# Patient Record
Sex: MALE | Race: WHITE | NOT HISPANIC OR LATINO | Employment: FULL TIME | URBAN - METROPOLITAN AREA
[De-identification: names, ages, dates, MRNs, and addresses within clinical notes are randomized per-mention and may not be internally consistent; named-entity substitution may affect disease eponyms.]

---

## 2020-05-28 ENCOUNTER — TRANSFERRED RECORDS (OUTPATIENT)
Dept: HEALTH INFORMATION MANAGEMENT | Facility: CLINIC | Age: 35
End: 2020-05-28

## 2021-08-13 ENCOUNTER — TRANSFERRED RECORDS (OUTPATIENT)
Dept: HEALTH INFORMATION MANAGEMENT | Facility: CLINIC | Age: 36
End: 2021-08-13

## 2021-09-09 ENCOUNTER — TRANSFERRED RECORDS (OUTPATIENT)
Dept: HEALTH INFORMATION MANAGEMENT | Facility: CLINIC | Age: 36
End: 2021-09-09

## 2021-09-16 ENCOUNTER — TRANSFERRED RECORDS (OUTPATIENT)
Dept: HEALTH INFORMATION MANAGEMENT | Facility: CLINIC | Age: 36
End: 2021-09-16

## 2021-10-21 DIAGNOSIS — D33.3 BENIGN NEOPLASM OF CRANIAL NERVE (H): Primary | ICD-10-CM

## 2021-10-25 ENCOUNTER — PRE VISIT (OUTPATIENT)
Dept: RADIATION ONCOLOGY | Facility: CLINIC | Age: 36
End: 2021-10-25
Payer: COMMERCIAL

## 2021-10-25 NOTE — NURSING NOTE
Date: 10/25/2021   Age: 36 year old  Ethnicity:    Sex: male  : 1985   Lives In: [place], [place]      Diagnosis: Right Acoustic Neuroma    Prior radiation therapy:   Site Treated: at  Facility: a  Dates: a  Dose: at    Prior chemotherapy:   Protocol: at  Facility: ta  Dates: at    RN time with patient:  Educated on Gamma Knife:    Doctors:  Dr. Yao Ye, Dr. Aldo Lyons, Daniela Chavarria,     Rosa at time of consult:  Does pt have a living will:  Does pt have implanted cardiac device:    Has pt fallen in past week:  Does pt feel steady on his feet:       Review Since Diagnosis:    ; hearing test by  noted no problem with assymetric hearing loss    Approximately 2017; pt started noticing decreased hearing in right ear, to the point of non functioning hearing, primary ordered a  Brain MRI    20; Brain MRI at [facility], mass right internal auditory canal, extends to right IAC laerally and CP cistern medially . Intracanalicular component 1.0 x 0.4 x 0.5 cm, Cisternal component 0.7 x 0.9 x 1.1 cm    20; saw an ENT, just follow up scans      21; Brain MRI at [facility], lesion right internal auditory canal and cerebellopontine angle 2.0 x 1.4 x 1.7 cm, felt to be an acoustic neuroma     21; pt saw Dr. Lyons, saw in consult,  Notes non functioning hearing on right, no numbness, tingling, balance issues.  Discussed options, felt should consult with Dr. Ye     21; pt saw Dr. Ye in consult for enlarging right acoustic neuroma, currently unable to use telephone on right, no tinnitus and no balance issues.  Discussed options but felt it should be treated and no longer observed    Chief Complaint: at consult

## 2021-11-02 NOTE — PROGRESS NOTES
Department of Radiation Oncology  [facility]  [address] [place] [address]  [phone]       Consultation Note    Name: Tevin Nichols MRN: [id number]   : 1985   Date of Service: 2021  Referring: Dr. Ye / neurosurgery     Diagnosis: RIGHT acoustic neuroma    History of Present Illness   Mr. Nichols is a 36 year old man with diagnosis of an enlarging RIGHT acoustic neuroma.    He initially began noticing decreased hearing in the right ear in . The hearing loss has been progressive since that time, which led to further evaluation with imaging.    MRI brain / IAC 20 demonstrated an enhancing extra-axial soft tissue mass centered at the porus acusticus of the right internal auditory canal with extension into the right IAC laterally and cerebellopontine cistern medially, consistent with an acoustic neuroma. The intracanalicular component measured 9.6 x 4.4 x 5.4 mm, and the cisternal component measured 7.2 x 9 x 10.7 mm.     He then established care with Dr. Lyons (ENT). The patient elected for observation. Follow-up MRI 21 demonstrated increased size of the acoustic neuroma, now filling and expanding the internal auditory canal. The aggregate measurement is now 20 x 14 x 17 mm.     Given growth of the acoustic neuroma, he was referred to Dr. Ye. Treatment options with either Gamma Knife stereotactic radiosurgery vs surgical resection via a translabyrinthine approach were discussed.    Today, he presents to discuss GK-SRS as a potential treatment option. He does not have tinnitus, headaches, or bothersome issues with balance (he continues to run and play sports). He does not have any serviceable hearing in the right ear.     CHEMOTHERAPY HISTORY: None  PACEMAKER: None  PAST RADIATION THERAPY HISTORY: None    PAST MEDICAL HISTORY:    Right acoustic neuroma    PAST SURGICAL HISTORY:    Tosillectomy    Orthopedic surgery on  "the ankle    ALLERGIES:  Allergies as of 11/04/2021 - Reviewed 11/04/2021   Allergen Reaction Noted     Codeine  10/25/2021     Sulfa drugs  10/25/2021       MEDICATIONS:  No current outpatient medications on file.        FAMILY HISTORY:  History reviewed. No pertinent family history.    SOCIAL HISTORY:  Single  Never smoker  Was in the   Works as  for [employer]    Review of Systems   A 12-point review of systems was performed. Pertinent findings are noted in the HPI.    Physical Exam   ECOG Status: 0    VITALS: BP (!) 142/78   Pulse 77   Resp 16   Ht 1.702 m (5' 7\")   Wt 86.2 kg (190 lb)   SpO2 94%   BMI 29.76 kg/m    GEN: Appears well, alert, oriented, and in NAD  HEENT: EOMI, normal conjunctiva, MMM  NECK: Supple, full ROM  CV: RRR, warm and well-perfused  RESP: No audible wheezing, breathing comfortably on room air  ABDOMEN: Soft, non-tender, non-distended  SKIN: Normal color and turgor  NEURO: Decreased hearing on the right side but otherwise CN 3-12 grossly intact, normal and symmetric motor and sensory exam in bilateral upper and lower extremities, normal gait  PSYCH: Appropriate mood and affect    Imaging/Path/Labs   Imaging: Reviewed    Path: N/A    Labs: No recent labs    Assessment    Mr. Nichols is a 36 year old man with an enlarging RIGHT acoustic neuroma.     Plan   The diagnosis and recent MRI imaging were reviewed with Mr. Nichols. Given the increased size of the tumor over the past year, treatment as opposed to further observation was recommended. Options for treatment include surgery vs radiosurgery. He had previously seen Dr. Ye and preferred the radiosurgery approach.    The logistics and rationale for treatment were discussed in detail. Goal of radiotherapy is for local control and prevent further growth of the tumor. Potential acute and late toxicities were reviewed. Informed consent was obtained.    He is scheduled to return for Gamma Knife on " 11/10/21.     Patient was seen and discussed with Dr. Hernandez.    Elidia Andujar MD PGY-3  Radiation Oncology      Attending addendum:   I saw and examined the patient with the resident and agree with the documented plan of care.    Mr. Nichols has an enlarging right acoustic neuroma in the setting of no serviceable hearing on the affected side. He has been evaluated by ENT and neurosurgery and has been recommended to proceed with treatment either with surgical resection or radiosurgery. I focused the bulk of our discussion today on the role of SRS in the treatment of acoustic neuromas such as his.     While there can be some slight reduction in size of the tumor on long-term follow-up after SRS, I explained that the goal of radiosurgery is to predominantly prevent further symptomatic growth of the tumor. I reviewed both the acute and potential late-term toxicities associated with skull base radiosurgery and answered Mr. Nichols's to his stated satisfaction. At the end of our discussion, Mr. Nichols was amenable to proceeding with SRS and signed informed consent to that effect.     I will have Mr. Nichols return to clinic on Wednesday, 11/10/2021, for frame-based single-fraction SRS using our Gamma Knife ICON treatment platform performed in coordination with Dr. Ye.     Lester Hernandez MD/PhD    Dept of Radiation Oncology  [facility]

## 2021-11-04 ENCOUNTER — OFFICE VISIT (OUTPATIENT)
Dept: RADIATION ONCOLOGY | Facility: CLINIC | Age: 36
End: 2021-11-04
Attending: RADIOLOGY
Payer: COMMERCIAL

## 2021-11-04 VITALS
RESPIRATION RATE: 16 BRPM | HEIGHT: 67 IN | HEART RATE: 77 BPM | OXYGEN SATURATION: 94 % | WEIGHT: 190 LBS | SYSTOLIC BLOOD PRESSURE: 142 MMHG | BODY MASS INDEX: 29.82 KG/M2 | DIASTOLIC BLOOD PRESSURE: 78 MMHG

## 2021-11-04 DIAGNOSIS — D33.3 ACOUSTIC NEUROMA (H): Primary | ICD-10-CM

## 2021-11-04 PROCEDURE — G0463 HOSPITAL OUTPT CLINIC VISIT: HCPCS | Performed by: RADIOLOGY

## 2021-11-04 ASSESSMENT — ENCOUNTER SYMPTOMS
MUSCULOSKELETAL NEGATIVE: 1
CARDIOVASCULAR NEGATIVE: 1
BLURRED VISION: 1
PSYCHIATRIC NEGATIVE: 1
TINGLING: 1
RESPIRATORY NEGATIVE: 1
CONSTITUTIONAL NEGATIVE: 1
GASTROINTESTINAL NEGATIVE: 1

## 2021-11-04 ASSESSMENT — PAIN SCALES - GENERAL: PAINLEVEL: NO PAIN (0)

## 2021-11-04 ASSESSMENT — MIFFLIN-ST. JEOR: SCORE: 1750.46

## 2021-11-04 NOTE — PROGRESS NOTES
HPI  Date: 10/25/2021   Age: 36 year old  Ethnicity:    Sex: male  : 1985   Lives In: [place], [place]      Diagnosis: Right Acoustic Neuroma    Prior radiation therapy:   None    Prior chemotherapy:   No    RN time with patient: 50 minutes  Educated on Gamma Knife: yes    Doctors:  Dr. Yao Ye, Dr. Aldo Lyons, Daniela Chavarria (PCP),     Pain at time of consult: No  Does pt have a living will: No  Does pt have implanted cardiac device: No    Has pt fallen in past week: No  Does pt feel steady on his feet: No      Review Since Diagnosis:    ; hearing test by  noted no problem with assymetric hearing loss    Approximately ; pt started noticing decreased hearing in right ear, to the point of non functioning hearing, primary ordered a  Brain MRI    20; Brain MRI at [facility], mass right internal auditory canal, extends to right IAC laerally and CP cistern medially . Intracanalicular component 1.0 x 0.4 x 0.5 cm, Cisternal component 0.7 x 0.9 x 1.1 cm    20; saw an ENT, just follow up scans      21; Brain MRI at [facility], lesion right internal auditory canal and cerebellopontine angle 2.0 x 1.4 x 1.7 cm, felt to be an acoustic neuroma     21; pt saw Dr. Lyons, saw in consult,  Notes non functioning hearing on right, no numbness, tingling, balance issues.  Discussed options, felt should consult with Dr. Ye     21; pt saw Dr. Ye in consult for enlarging right acoustic neuroma, currently unable to use telephone on right, no tinnitus and no balance issues.  Discussed options but felt it should be treated and no longer observed    Chief Complaint:  Hearing: virtually lost of right side  Phone: use on left ear only  Tinnitus: as below  Balance: just started noticing slight imbalance with quick turning; doesn't impede any regular activity    Review of Systems   Constitutional: Negative.    HENT: Positive for hearing loss and tinnitus.          No hearing in right ear. Took about 1.5 years for basically total loss. Used to have tinnitus in ear but no longer does   Eyes: Positive for blurred vision.        Right eye with slight blurred vision; no correction needed.    Respiratory: Negative.    Cardiovascular: Negative.    Gastrointestinal: Negative.    Genitourinary: Negative.    Musculoskeletal: Negative.    Skin: Negative.    Neurological: Positive for tingling.        Describes 'lighting' shooting pain directly on top of right ear. Last a second or two. Happens about every 2 weeks. This started happening @ 2-3 years ago but become more aware after diagnosis confirmed   Endo/Heme/Allergies: Negative.    Psychiatric/Behavioral: Negative.

## 2021-11-04 NOTE — LETTER
2021         RE: Tevin Nichols  [address] [address]         Department of Radiation Oncology  [facility]  [address] [place] [address]  [phone]       Consultation Note    Name: Tevin Nichols MRN: [id number]   : 1985   Date of Service: 2021  Referring: Dr. Ye / neurosurgery     Diagnosis: RIGHT acoustic neuroma    History of Present Illness   Mr. Nichols is a 36 year old man with diagnosis of an enlarging RIGHT acoustic neuroma.    He initially began noticing decreased hearing in the right ear in . The hearing loss has been progressive since that time, which led to further evaluation with imaging.    MRI brain / IAC 20 demonstrated an enhancing extra-axial soft tissue mass centered at the porus acusticus of the right internal auditory canal with extension into the right IAC laterally and cerebellopontine cistern medially, consistent with an acoustic neuroma. The intracanalicular component measured 9.6 x 4.4 x 5.4 mm, and the cisternal component measured 7.2 x 9 x 10.7 mm.     He then established care with Dr. Lyons (ENT). The patient elected for observation. Follow-up MRI 21 demonstrated increased size of the acoustic neuroma, now filling and expanding the internal auditory canal. The aggregate measurement is now 20 x 14 x 17 mm.     Given growth of the acoustic neuroma, he was referred to Dr. Ye. Treatment options with either Gamma Knife stereotactic radiosurgery vs surgical resection via a translabyrinthine approach were discussed.    Today, he presents to discuss GK-SRS as a potential treatment option. He does not have tinnitus, headaches, or bothersome issues with balance (he continues to run and play sports). He does not have any serviceable hearing in the right ear.     CHEMOTHERAPY HISTORY: None  PACEMAKER: None  PAST RADIATION THERAPY HISTORY: None    PAST MEDICAL HISTORY:    Right  "acoustic neuroma    PAST SURGICAL HISTORY:    Tosillectomy    Orthopedic surgery on the ankle    ALLERGIES:  Allergies as of 11/04/2021 - Reviewed 11/04/2021   Allergen Reaction Noted     Codeine  10/25/2021     Sulfa drugs  10/25/2021       MEDICATIONS:  No current outpatient medications on file.        FAMILY HISTORY:  History reviewed. No pertinent family history.    SOCIAL HISTORY:  Single  Never smoker  Was in the   Works as  for [employer]    Review of Systems   A 12-point review of systems was performed. Pertinent findings are noted in the HPI.    Physical Exam   ECOG Status: 0    VITALS: BP (!) 142/78   Pulse 77   Resp 16   Ht 1.702 m (5' 7\")   Wt 86.2 kg (190 lb)   SpO2 94%   BMI 29.76 kg/m    GEN: Appears well, alert, oriented, and in NAD  HEENT: EOMI, normal conjunctiva, MMM  NECK: Supple, full ROM  CV: RRR, warm and well-perfused  RESP: No audible wheezing, breathing comfortably on room air  ABDOMEN: Soft, non-tender, non-distended  SKIN: Normal color and turgor  NEURO: Decreased hearing on the right side but otherwise CN 3-12 grossly intact, normal and symmetric motor and sensory exam in bilateral upper and lower extremities, normal gait  PSYCH: Appropriate mood and affect    Imaging/Path/Labs   Imaging: Reviewed    Path: N/A    Labs: No recent labs    Assessment    Mr. Nichols is a 36 year old man with an enlarging RIGHT acoustic neuroma.     Plan   The diagnosis and recent MRI imaging were reviewed with Mr. Nichols. Given the increased size of the tumor over the past year, treatment as opposed to further observation was recommended. Options for treatment include surgery vs radiosurgery. He had previously seen Dr. Ye and preferred the radiosurgery approach.    The logistics and rationale for treatment were discussed in detail. Goal of radiotherapy is for local control and prevent further growth of the tumor. Potential acute and late toxicities were reviewed. " Informed consent was obtained.    He is scheduled to return for Gamma Knife on 11/10/21.     Patient was seen and discussed with Dr. Hernandez.    Elidia Andujar MD PGY-3  Radiation Oncology      Attending addendum:   I saw and examined the patient with the resident and agree with the documented plan of care.    Mr. Nichols has an enlarging right acoustic neuroma in the setting of no serviceable hearing on the affected side. He has been evaluated by ENT and neurosurgery and has been recommended to proceed with treatment either with surgical resection or radiosurgery. I focused the bulk of our discussion today on the role of SRS in the treatment of acoustic neuromas such as his.     While there can be some slight reduction in size of the tumor on long-term follow-up after SRS, I explained that the goal of radiosurgery is to predominantly prevent further symptomatic growth of the tumor. I reviewed both the acute and potential late-term toxicities associated with skull base radiosurgery and answered Mr. Nichols's to his stated satisfaction. At the end of our discussion, Mr. Nichols was amenable to proceeding with SRS and signed informed consent to that effect.     I will have Mr. Nichols return to clinic on Wednesday, 11/10/2021, for frame-based single-fraction SRS using our Gamma Knife ICON treatment platform performed in coordination with Dr. Ye.     Lester Hernandez MD/PhD    Dept of Radiation Oncology  [facility]        HPI  Date: 10/25/2021   Age: 36 year old  Ethnicity:    Sex: male  : 1985   Lives In: [place], [place]      Diagnosis: Right Acoustic Neuroma    Prior radiation therapy:   None    Prior chemotherapy:   No    RN time with patient: 50 minutes  Educated on Gamma Knife: yes    Doctors:  Dr. Yao Ye, Dr. Aldo Lyons, Daniela Chavarria (PCP),     Pain at time of consult: No  Does pt have a living will: No  Does pt have implanted cardiac device:  No    Has pt fallen in past week: No  Does pt feel steady on his feet: No      Review Since Diagnosis:    2014; hearing test by  noted no problem with assymetric hearing loss    Approximately 2017; pt started noticing decreased hearing in right ear, to the point of non functioning hearing, primary ordered a  Brain MRI    5/28/20; Brain MRI at [facility], mass right internal auditory canal, extends to right IAC laerally and CP cistern medially . Intracanalicular component 1.0 x 0.4 x 0.5 cm, Cisternal component 0.7 x 0.9 x 1.1 cm    6/1/20; saw an ENT, just follow up scans      8/13/21; Brain MRI at [facility], lesion right internal auditory canal and cerebellopontine angle 2.0 x 1.4 x 1.7 cm, felt to be an acoustic neuroma     9/9/21; pt saw Dr. Lyons, saw in consult,  Notes non functioning hearing on right, no numbness, tingling, balance issues.  Discussed options, felt should consult with Dr. Ye     9/16/21; pt saw Dr. Ye in consult for enlarging right acoustic neuroma, currently unable to use telephone on right, no tinnitus and no balance issues.  Discussed options but felt it should be treated and no longer observed    Chief Complaint:  Hearing: virtually lost of right side  Phone: use on left ear only  Tinnitus: as below  Balance: just started noticing slight imbalance with quick turning; doesn't impede any regular activity    Review of Systems   Constitutional: Negative.    HENT: Positive for hearing loss and tinnitus.         No hearing in right ear. Took about 1.5 years for basically total loss. Used to have tinnitus in ear but no longer does   Eyes: Positive for blurred vision.        Right eye with slight blurred vision; no correction needed.    Respiratory: Negative.    Cardiovascular: Negative.    Gastrointestinal: Negative.    Genitourinary: Negative.    Musculoskeletal: Negative.    Skin: Negative.    Neurological: Positive for tingling.        Describes 'lighting' shooting pain  directly on top of right ear. Last a second or two. Happens about every 2 weeks. This started happening @ 2-3 years ago but become more aware after diagnosis confirmed   Endo/Heme/Allergies: Negative.    Psychiatric/Behavioral: Negative.          Lester Hernandez MD

## 2021-11-09 ENCOUNTER — APPOINTMENT (OUTPATIENT)
Dept: RADIATION ONCOLOGY | Facility: CLINIC | Age: 36
End: 2021-11-09
Attending: RADIOLOGY
Payer: COMMERCIAL

## 2021-11-09 DIAGNOSIS — D33.3 ACOUSTIC NEUROMA (H): ICD-10-CM

## 2021-11-09 LAB — SARS-COV-2 RNA RESP QL NAA+PROBE: NEGATIVE

## 2021-11-09 PROCEDURE — U0005 INFEC AGEN DETEC AMPLI PROBE: HCPCS | Performed by: RADIOLOGY

## 2021-11-10 ENCOUNTER — OFFICE VISIT (OUTPATIENT)
Dept: RADIATION ONCOLOGY | Facility: CLINIC | Age: 36
End: 2021-11-10
Attending: RADIOLOGY
Payer: COMMERCIAL

## 2021-11-10 ENCOUNTER — HOSPITAL ENCOUNTER (OUTPATIENT)
Dept: MRI IMAGING | Facility: CLINIC | Age: 36
End: 2021-11-10
Attending: RADIOLOGY
Payer: COMMERCIAL

## 2021-11-10 ENCOUNTER — HOSPITAL ENCOUNTER (OUTPATIENT)
Dept: MEDSURG UNIT | Facility: CLINIC | Age: 36
End: 2021-11-10
Attending: RADIOLOGY
Payer: COMMERCIAL

## 2021-11-10 VITALS
DIASTOLIC BLOOD PRESSURE: 84 MMHG | HEART RATE: 79 BPM | RESPIRATION RATE: 16 BRPM | SYSTOLIC BLOOD PRESSURE: 127 MMHG | OXYGEN SATURATION: 96 % | TEMPERATURE: 97.8 F

## 2021-11-10 VITALS
DIASTOLIC BLOOD PRESSURE: 69 MMHG | SYSTOLIC BLOOD PRESSURE: 122 MMHG | OXYGEN SATURATION: 100 % | HEART RATE: 77 BPM | RESPIRATION RATE: 16 BRPM

## 2021-11-10 DIAGNOSIS — D33.3 BENIGN NEOPLASM OF CRANIAL NERVE (H): Primary | ICD-10-CM

## 2021-11-10 DIAGNOSIS — D33.3 BENIGN NEOPLASM OF CRANIAL NERVE (H): ICD-10-CM

## 2021-11-10 PROCEDURE — 999N000142 HC STATISTIC PROCEDURE PREP ONLY

## 2021-11-10 PROCEDURE — 77295 3-D RADIOTHERAPY PLAN: CPT | Performed by: RADIOLOGY

## 2021-11-10 PROCEDURE — 77432 STEREOTACTIC RADIATION TRMT: CPT | Performed by: RADIOLOGY

## 2021-11-10 PROCEDURE — 255N000002 HC RX 255 OP 636: Performed by: RADIOLOGY

## 2021-11-10 PROCEDURE — 77300 RADIATION THERAPY DOSE PLAN: CPT | Mod: 26 | Performed by: RADIOLOGY

## 2021-11-10 PROCEDURE — A9585 GADOBUTROL INJECTION: HCPCS | Performed by: RADIOLOGY

## 2021-11-10 PROCEDURE — 250N000013 HC RX MED GY IP 250 OP 250 PS 637: Performed by: RADIOLOGY

## 2021-11-10 PROCEDURE — 250N000011 HC RX IP 250 OP 636: Performed by: RADIOLOGY

## 2021-11-10 PROCEDURE — 70552 MRI BRAIN STEM W/DYE: CPT | Mod: 26 | Performed by: RADIOLOGY

## 2021-11-10 PROCEDURE — 77334 RADIATION TREATMENT AID(S): CPT | Performed by: RADIOLOGY

## 2021-11-10 PROCEDURE — 70552 MRI BRAIN STEM W/DYE: CPT

## 2021-11-10 PROCEDURE — 77263 THER RADIOLOGY TX PLNG CPLX: CPT | Performed by: RADIOLOGY

## 2021-11-10 PROCEDURE — 77300 RADIATION THERAPY DOSE PLAN: CPT | Performed by: RADIOLOGY

## 2021-11-10 PROCEDURE — 77295 3-D RADIOTHERAPY PLAN: CPT | Mod: 26 | Performed by: RADIOLOGY

## 2021-11-10 PROCEDURE — 77334 RADIATION TREATMENT AID(S): CPT | Mod: 26 | Performed by: RADIOLOGY

## 2021-11-10 PROCEDURE — 250N000009 HC RX 250: Performed by: RADIOLOGY

## 2021-11-10 PROCEDURE — 77370 RADIATION PHYSICS CONSULT: CPT | Performed by: RADIOLOGY

## 2021-11-10 PROCEDURE — 77371 SRS MULTISOURCE: CPT | Performed by: RADIOLOGY

## 2021-11-10 RX ORDER — LORAZEPAM 0.5 MG/1
.5-1 TABLET ORAL
Status: DISCONTINUED | OUTPATIENT
Start: 2021-11-10 | End: 2021-11-10 | Stop reason: HOSPADM

## 2021-11-10 RX ORDER — ONDANSETRON 2 MG/ML
4 INJECTION INTRAMUSCULAR; INTRAVENOUS
Status: DISCONTINUED | OUTPATIENT
Start: 2021-11-10 | End: 2021-11-10 | Stop reason: HOSPADM

## 2021-11-10 RX ORDER — ONDANSETRON 4 MG/1
8 TABLET, ORALLY DISINTEGRATING ORAL EVERY 6 HOURS PRN
Status: DISCONTINUED | OUTPATIENT
Start: 2021-11-10 | End: 2021-11-10 | Stop reason: HOSPADM

## 2021-11-10 RX ORDER — ACETAMINOPHEN 325 MG/1
650 TABLET ORAL EVERY 4 HOURS PRN
Status: DISCONTINUED | OUTPATIENT
Start: 2021-11-10 | End: 2021-11-10 | Stop reason: HOSPADM

## 2021-11-10 RX ORDER — LORAZEPAM 0.5 MG/1
.5-2 TABLET ORAL
Status: COMPLETED | OUTPATIENT
Start: 2021-11-10 | End: 2021-11-10

## 2021-11-10 RX ORDER — LIDOCAINE 40 MG/G
1 CREAM TOPICAL SEE ADMIN INSTRUCTIONS
Status: COMPLETED | OUTPATIENT
Start: 2021-11-10 | End: 2021-11-10

## 2021-11-10 RX ORDER — GADOBUTROL 604.72 MG/ML
9 INJECTION INTRAVENOUS ONCE
Status: COMPLETED | OUTPATIENT
Start: 2021-11-10 | End: 2021-11-10

## 2021-11-10 RX ADMIN — BUPIVACAINE HYDROCHLORIDE 30 ML: 7.5 INJECTION, SOLUTION EPIDURAL; RETROBULBAR at 05:41

## 2021-11-10 RX ADMIN — GADOBUTROL 9 ML: 604.72 INJECTION INTRAVENOUS at 07:34

## 2021-11-10 RX ADMIN — LIDOCAINE 1 G: 40 CREAM TOPICAL at 05:41

## 2021-11-10 RX ADMIN — LORAZEPAM 1 MG: 0.5 TABLET ORAL at 05:41

## 2021-11-10 NOTE — PROGRESS NOTES
Arrived to unit 2a from gamma knife- head gear placement. AFVSS. Denies pain. Tolerating PO. Emergency bedside kit present. Girlfriend at bedside. Stable.

## 2021-11-10 NOTE — PROGRESS NOTES
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  After sedation and topical anesthetic, the head frame was put on by Dr. Ye.    The patient was then taken to the Department of Radiology where a stereotactic brain MRI was performed.  The patient was admitted to the  care area while treatment planning was completed.      These Images were then sent to the [employer] Gamma Knife computer system where a three dimensional stereotactic plan was generated.   The patient was then treated on the [employer] Gamma Knife.  Treatment involved 1 targets    The [employer] Gamma Knife IconTM Plan software was used to create a highly conformal dose distribution using the number and size collimators detailed above.     TREATMENT:    The patient was brought to the Gamma Knife suite.  The patient was identified by 2 methods. A timeout was performed to confirm the correct patient and correct procedure. The site was identified by an MRI image and treatment planning software, which defined the treatment area.     A cone beam CT was done and compared to the MRI to look for frame motion  We evaluated the frame manually to ensure it was still secure.     The treatment was delivered using the [employer] Gamma Knife IconTM without complication.  The head frame was removed and the patient was discharged home in stable condition.  The patient tolerated the treatment well and had no complications.    FOLLOW-UP PLANS:  The Gamma Knife Nurse  Coordinator will call the patient tomorrow for short-term follow up.  Written discharge instructions were given to the patient.     Approved by:  Lester Hernandez MD/PhD

## 2021-11-10 NOTE — PROGRESS NOTES
PREOPERATIVE DIAGNOSIS:  Right vestibular schwannoma    POSTOPERATIVE DIAGNOSIS:  Same    OPERATIVE PROCEDURES:  1.  Gamma Knife radiosurgery to right vestibular schwannoma, complex  2.  Application of stereotactic head frame for stereotactic radiosurgery    SURGEON:  Yao Ye MD    ASSISTANT:  None    ANESTHESIA:  Local    INDICATIONS FOR THE PROCEDURE:  Mr. Tevin Nichols is a 36 year-old male who presented with right-sided hearing loss.  He was found to have a vestibular schwannoma which was initially followed with serial imaging.  His most recent MRI showed interval growth with the tumor now measuring 20 x 14 x 17 mm.  Treatment options were discussed.  Ultimately, Gamma Knife stereotactic radiosurgery was chosen to treat this complex lesion.    DESCRIPTION OF THE PROCEDURE:  On the morning of the procedure, the patient was brought to the Gamma Knife radiosurgery area.  A pre-procedure pause was performed to confirm the identity and date of birth of the patient, as well as the procedure site.  The scalp was prepped with betadine and then anesthetized with a combination of marcaine, lidocaine, and epinephrine.  The [employer] G-frame was applied and the pins were fixed to hand tightness.  The patient tolerated the application of the frame well.    The patient was taken to the MRI scanner where an MRI with gadolinium contrast was obtained.  The patient returned from MRI.  The lesion was outlined on the planning software and we made a conformal dose outline for this lesion.  Dr. Hernandez selected the radiation dose for the lesion.  Measurements were taken,  steps performed, and the patient was then brought into the treatment room.  Radiation was given according to the prescription.    The patient was then taken out of the unit and out of the treatment room.  The stereotactic frame was removed and a dressing was applied.  After observation, the patient was discharged.  Follow up arrangements will be  made for the patient.    I attest that I was present for the entirety of the case, including pre-procedure assessment, stereotactic head frame placement, treatment planning, treatment delivery, as well as frame removal at the end of the treatment.

## 2021-11-10 NOTE — LETTER
11/10/2021         RE: Tevin Nichols  [address] [facility] [address]        Dear Colleague,    Thank you for referring your patient, Tevin Nichols, to the [facility] RADIATION ONCOLOGY GAMMA KNIFE. Please see a copy of my visit note below.    PREOPERATIVE DIAGNOSIS:  Right vestibular schwannoma    POSTOPERATIVE DIAGNOSIS:  Same    OPERATIVE PROCEDURES:  1.  Gamma Knife radiosurgery to right vestibular schwannoma, complex  2.  Application of stereotactic head frame for stereotactic radiosurgery    SURGEON:  Yao Ye MD    ASSISTANT:  None    ANESTHESIA:  Local    INDICATIONS FOR THE PROCEDURE:  Mr. Tevin Nichols is a 36 year-old male who presented with right-sided hearing loss.  He was found to have a vestibular schwannoma which was initially followed with serial imaging.  His most recent MRI showed interval growth with the tumor now measuring 20 x 14 x 17 mm.  Treatment options were discussed.  Ultimately, Gamma Knife stereotactic radiosurgery was chosen to treat this complex lesion.    DESCRIPTION OF THE PROCEDURE:  On the morning of the procedure, the patient was brought to the Gamma Knife radiosurgery area.  A pre-procedure pause was performed to confirm the identity and date of birth of the patient, as well as the procedure site.  The scalp was prepped with betadine and then anesthetized with a combination of marcaine, lidocaine, and epinephrine.  The [employer] G-frame was applied and the pins were fixed to hand tightness.  The patient tolerated the application of the frame well.    The patient was taken to the MRI scanner where an MRI with gadolinium contrast was obtained.  The patient returned from MRI.  The lesion was outlined on the planning software and we made a conformal dose outline for this lesion.  Dr. Hernandez selected the radiation dose for the lesion.  Measurements were taken,  steps performed, and the patient was then brought into the treatment room.   Radiation was given according to the prescription.    The patient was then taken out of the unit and out of the treatment room.  The stereotactic frame was removed and a dressing was applied.  After observation, the patient was discharged.  Follow up arrangements will be made for the patient.    I attest that I was present for the entirety of the case, including pre-procedure assessment, stereotactic head frame placement, treatment planning, treatment delivery, as well as frame removal at the end of the treatment.              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  After sedation and topical anesthetic, the head frame was put on by Dr. Ye.    The patient was then taken to the Department of Radiology where a stereotactic brain MRI was performed.  The patient was admitted to the  care area while treatment planning was completed.      These Images were then sent to the [employer] Gamma Knife computer system where a three dimensional stereotactic plan was generated.   The patient was then treated on the [employer] Gamma Knife.  Treatment involved 1 targets    The [employer] Gamma Knife IconTM Plan software was used to create a highly conformal dose distribution using the number and size collimators detailed above.     TREATMENT:    The patient was brought to the Gamma Knife suite.  The patient was identified by 2 methods. A timeout was performed to confirm the correct patient and correct procedure. The site was  identified by an MRI image and treatment planning software, which defined the treatment area.     A cone beam CT was done and compared to the MRI to look for frame motion  We evaluated the frame manually to ensure it was still secure.     The treatment was delivered using the [employer] Gamma Knife IconTM without complication.  The head frame was removed and the patient was discharged home in stable condition.  The patient tolerated the treatment well and had no complications.    FOLLOW-UP PLANS:  The Gamma Knife Nurse Coordinator will call the patient tomorrow for short-term follow up.  Written discharge instructions were given to the patient.     Approved by:  Lester Hernandez MD/PhD      Again, thank you for allowing me to participate in the care of your patient.        Sincerely,        Lesetr Hernandez MD

## 2021-11-11 ENCOUNTER — TELEPHONE (OUTPATIENT)
Dept: RADIATION ONCOLOGY | Facility: CLINIC | Age: 36
End: 2021-11-11
Payer: COMMERCIAL

## 2021-11-11 NOTE — TELEPHONE ENCOUNTER
A telephone voice message was left for Tevin in follow up to his Gamma Knife treatment on 11/10/21.  Tevin left a telephone voice message back stating that he thought everything was going fine.  Tevin stated he is fatigued today but other than that no problems.

## (undated) RX ORDER — LORAZEPAM 0.5 MG/1
TABLET ORAL
Status: DISPENSED
Start: 2021-11-10

## (undated) RX ORDER — LIDOCAINE 40 MG/G
CREAM TOPICAL
Status: DISPENSED
Start: 2021-11-10